# Patient Record
Sex: MALE | Race: WHITE | NOT HISPANIC OR LATINO | Employment: OTHER | URBAN - METROPOLITAN AREA
[De-identification: names, ages, dates, MRNs, and addresses within clinical notes are randomized per-mention and may not be internally consistent; named-entity substitution may affect disease eponyms.]

---

## 2020-06-10 ENCOUNTER — OFFICE VISIT (OUTPATIENT)
Dept: NEUROLOGY | Facility: CLINIC | Age: 65
End: 2020-06-10

## 2020-06-10 VITALS
DIASTOLIC BLOOD PRESSURE: 58 MMHG | BODY MASS INDEX: 32.23 KG/M2 | HEART RATE: 76 BPM | TEMPERATURE: 97.7 F | SYSTOLIC BLOOD PRESSURE: 119 MMHG | WEIGHT: 238 LBS | HEIGHT: 72 IN | OXYGEN SATURATION: 98 %

## 2020-06-10 DIAGNOSIS — I63.9 CEREBROVASCULAR ACCIDENT (CVA), UNSPECIFIED MECHANISM (HCC): Primary | ICD-10-CM

## 2020-06-10 PROCEDURE — 99204 OFFICE O/P NEW MOD 45 MIN: CPT | Performed by: PSYCHIATRY & NEUROLOGY

## 2020-06-10 RX ORDER — LISINOPRIL 30 MG/1
TABLET ORAL
COMMUNITY
Start: 2020-04-22

## 2020-06-10 RX ORDER — GABAPENTIN 800 MG/1
TABLET ORAL
COMMUNITY
Start: 2020-05-29

## 2020-06-10 RX ORDER — HUMAN INSULIN 100 [USP'U]/ML
INJECTION, SUSPENSION SUBCUTANEOUS
COMMUNITY
Start: 2020-05-29

## 2020-06-10 RX ORDER — HYDROCODONE BITARTRATE AND ACETAMINOPHEN 10; 325 MG/1; MG/1
1 TABLET ORAL EVERY 6 HOURS PRN
COMMUNITY

## 2020-06-10 NOTE — PROGRESS NOTES
Subjective   Patient ID: Pb Davis is a 65 y.o. male     Chief Complaint   Patient presents with   • Visual Changes        History of Present Illness    65 y.o. male referred by Dr Nesbitt for visual changes. Pt reports loss of vision in right eye after eye surgery.      MRI Brain, mild atrophy, small vessel dz. Right middle turbinate irregular appearance recommended ENT follow up     Admitted to [facility], 6/3/20 for acute onset slurred speech and weakness in legs.   Night before admission pt had LOC.  Left facial droop and unsteady gait after discharge.     3 - 4 falls since discharge.      ASA/lisinopril     DM since 43 yoa.      Previous retinal detachment surgery OD 2019.        Past Medical History:   Diagnosis Date   • Diabetes (CMS/HCC)      Family History   Problem Relation Age of Onset   • Hypertension Mother    • Pancreatic cancer Mother    • Alzheimer's disease Mother    • Diabetes Father      Social History     Socioeconomic History   • Marital status:      Spouse name: Not on file   • Number of children: Not on file   • Years of education: Not on file   • Highest education level: Not on file   Tobacco Use   • Smoking status: Former Smoker     Packs/day: 3.00   • Smokeless tobacco: Never Used   Substance and Sexual Activity   • Alcohol use: Never     Frequency: Never   • Drug use: Never   • Sexual activity: Defer       Review of Systems   Constitutional: Negative for activity change, fatigue and unexpected weight change.   HENT: Negative for facial swelling, hearing loss, tinnitus, trouble swallowing and voice change.    Eyes: Positive for visual disturbance. Negative for photophobia and pain.   Respiratory: Negative for apnea, cough and choking.    Cardiovascular: Negative for chest pain.   Gastrointestinal: Negative for constipation, nausea and vomiting.   Endocrine: Negative for cold intolerance.   Genitourinary: Negative for difficulty urinating, frequency and urgency.  "  Musculoskeletal: Negative for arthralgias, back pain, gait problem, myalgias, neck pain and neck stiffness.   Skin: Negative for rash.   Allergic/Immunologic: Negative for immunocompromised state.   Neurological: Positive for weakness. Negative for dizziness, tremors, seizures, syncope, facial asymmetry, speech difficulty, light-headedness, numbness and headaches.   Hematological: Negative for adenopathy.   Psychiatric/Behavioral: Negative for confusion, decreased concentration, hallucinations and sleep disturbance. The patient is not nervous/anxious.        Objective     Vitals:    06/10/20 1448   BP: 119/58   Pulse: 76   Temp: 97.7 °F (36.5 °C)   SpO2: 98%   Weight: 108 kg (238 lb)   Height: 182.9 cm (72\")       Neurologic Exam     Mental Status   Oriented to person, place, and time.   Registration: recalls 3 of 3 objects. Recall at 5 minutes: recalls 3 of 3 objects. Follows 3 step commands.   Attention: normal. Concentration: normal.   Speech: speech is normal   Level of consciousness: alert  Knowledge: good and consistent with education.   Able to name object. Able to read. Able to repeat. Able to write. Normal comprehension.     Cranial Nerves     CN II   Visual acuity: normal  Right visual field deficit: upper nasal, upper temporal, lower nasal and lower temporal quadrant(s)  Left visual field deficit: none     CN III, IV, VI   Pupils are equal, round, and reactive to light.  Extraocular motions are normal.   Right pupil: Shape: regular. Reactivity: brisk. Consensual response: intact.   Left pupil: Shape: regular. Reactivity: brisk. Consensual response: intact.   Nystagmus: none   Diplopia: none  Ophthalmoparesis: none  Upgaze: normal  Downgaze: normal  Conjugate gaze: present  Vestibulo-ocular reflex: present    CN V   Facial sensation intact.   Right corneal reflex: normal  Left corneal reflex: normal    CN VII   Right facial weakness: none  Left facial weakness: central    CN VIII   Hearing: intact    CN " IX, X   Palate: symmetric  Right gag reflex: normal  Left gag reflex: normal    CN XI   Right sternocleidomastoid strength: normal  Left sternocleidomastoid strength: normal    CN XII   Tongue: not atrophic  Fasciculations: absent  Tongue deviation: none    Motor Exam   Muscle bulk: normal  Overall muscle tone: normal  Right arm tone: normal  Left arm tone: normal  Right leg tone: normal  Left leg tone: normal    Strength   Strength 5/5 throughout.     Sensory Exam   Light touch normal.   Vibration normal.   Proprioception normal.   Pinprick normal.     Gait, Coordination, and Reflexes     Gait  Gait: wide-based    Coordination   Romberg: negative  Finger to nose coordination: normal  Heel to shin coordination: normal  Tandem walking coordination: normal    Tremor   Resting tremor: absent  Intention tremor: absent  Action tremor: absent    Reflexes   Reflexes 2+ except as noted.       Physical Exam   Constitutional: He is oriented to person, place, and time. Vital signs are normal. He appears well-developed and well-nourished.   HENT:   Head: Normocephalic and atraumatic.   Eyes: Pupils are equal, round, and reactive to light. EOM and lids are normal.   Fundoscopic exam:       The right eye shows no exudate, no hemorrhage and no papilledema. The right eye shows venous pulsations.        The left eye shows no exudate, no hemorrhage and no papilledema. The left eye shows venous pulsations.   Neck: Normal range of motion and phonation normal. Normal carotid pulses present. Carotid bruit is not present. No thyroid mass and no thyromegaly present.   Cardiovascular: Normal rate, regular rhythm and normal heart sounds.   Pulmonary/Chest: Effort normal.   Neurological: He is oriented to person, place, and time. He has normal strength. He has a normal Finger-Nose-Finger Test, a normal Heel to Shin Test, a normal Romberg Test and a normal Tandem Gait Test.   Skin: Skin is warm and dry.   Psychiatric: He has a normal mood and  affect. His speech is normal.   Nursing note and vitals reviewed.      No results found for any previous visit.         Assessment/Plan     Problem List Items Addressed This Visit        Cardiovascular and Mediastinum    Cerebrovascular accident (CVA) (CMS/[facility]) - Primary    Current Assessment & Plan     Pt reports having had a stroke 6/3/2020    Will obtain records from [facility]     Continue ASA/lisinopril/statin                   No follow-ups on file.

## 2020-06-10 NOTE — ASSESSMENT & PLAN NOTE
Pt reports having had a stroke 6/3/2020    Will obtain records from [facility]     Continue ASA/lisinopril/statin

## 2020-06-12 ENCOUNTER — TELEMEDICINE (OUTPATIENT)
Dept: NEUROLOGY | Facility: CLINIC | Age: 65
End: 2020-06-12

## 2020-06-12 DIAGNOSIS — I63.9 CEREBROVASCULAR ACCIDENT (CVA), UNSPECIFIED MECHANISM (HCC): Primary | ICD-10-CM

## 2020-06-12 PROCEDURE — 99213 OFFICE O/P EST LOW 20 MIN: CPT | Performed by: PSYCHIATRY & NEUROLOGY

## 2020-06-12 NOTE — PROGRESS NOTES
Subjective      You have chosen to receive care through a telehealth visit.  Do you consent to use a video/audio connection for your medical care today? Yes    Time:  15 minutes    Patient ID: Pb Davis is a 65 y.o. male     Chief Complaint   Patient presents with   • Cerebrovascular Accident     Follow up         History of Present Illness    65 y.o. male returns in follow up for birgit. Last visit on 6/10/20 requested hospital records from [facility], continue ASA/statin, referred to PT for unsteady gait.       Continues to have unsteady gait, trouble swallowing.      Home health requested to come to provide services.     Reviewed hospital records:    Echo - no PFO, or source of emboli  CTA H & N - no sig stenosis    Discharged on ASA/statin.       Problem history:    Pt reports loss of vision in right eye after eye surgery.      MRI Brain, mild atrophy, small vessel dz. Right middle turbinate irregular appearance recommended ENT follow up     Admitted to [facility], 6/3/20 for acute onset slurred speech and weakness in legs.   Night before admission pt had LOC.  Left facial droop and unsteady gait after discharge.     3 - 4 falls since discharge.      ASA/lisinopril     DM since 43 yoa.      Previous retinal detachment surgery OD 2019.        Past Medical History:   Diagnosis Date   • Diabetes (CMS/formerly [facility])      Family History   Problem Relation Age of Onset   • Hypertension Mother    • Pancreatic cancer Mother    • Alzheimer's disease Mother    • Diabetes Father      Social History     Socioeconomic History   • Marital status:      Spouse name: Not on file   • Number of children: Not on file   • Years of education: Not on file   • Highest education level: Not on file   Tobacco Use   • Smoking status: Former Smoker     Packs/day: 3.00   • Smokeless tobacco: Never Used   Substance and Sexual Activity   • Alcohol use: Never     Frequency: Never   • Drug use: Never   • Sexual activity:  Defer       Review of Systems   Constitutional: Positive for fatigue. Negative for activity change and unexpected weight change.   HENT: Negative for facial swelling, hearing loss, tinnitus, trouble swallowing and voice change.    Eyes: Positive for visual disturbance. Negative for photophobia and pain.   Respiratory: Negative for apnea, cough and choking.    Cardiovascular: Negative for chest pain.   Gastrointestinal: Negative for constipation, nausea and vomiting.   Endocrine: Negative for cold intolerance.   Genitourinary: Negative for difficulty urinating, frequency and urgency.   Musculoskeletal: Positive for gait problem. Negative for arthralgias, back pain, myalgias, neck pain and neck stiffness.   Skin: Negative for rash.   Allergic/Immunologic: Negative for immunocompromised state.   Neurological: Positive for weakness and light-headedness. Negative for dizziness, tremors, seizures, syncope, facial asymmetry, speech difficulty, numbness and headaches.   Hematological: Negative for adenopathy.   Psychiatric/Behavioral: Negative for confusion, decreased concentration, hallucinations and sleep disturbance. The patient is nervous/anxious.        Objective     Neurologic Exam     Mental Status   Attention: normal. Concentration: normal.   Speech: speech is normal   Level of consciousness: alert  Knowledge: good.   Normal comprehension.     Cranial Nerves     CN III, IV, VI   Extraocular motions are normal.     CN VII   Facial expression full, symmetric.     CN VIII   CN VIII normal.     Motor Exam MAEW       Physical Exam   Constitutional: He appears well-developed and well-nourished.   Eyes: EOM are normal.   Psychiatric: His speech is normal.       No results found for any previous visit.         Assessment/Plan     Problem List Items Addressed This Visit        Cardiovascular and Mediastinum    Cerebrovascular accident (CVA) (CMS/[facility]) - Primary    Current Assessment & Plan     Home health coming out to house  tomorrow    Continue ASA/Crestor                       No follow-ups on file.